# Patient Record
Sex: FEMALE | Race: BLACK OR AFRICAN AMERICAN | URBAN - METROPOLITAN AREA
[De-identification: names, ages, dates, MRNs, and addresses within clinical notes are randomized per-mention and may not be internally consistent; named-entity substitution may affect disease eponyms.]

---

## 2017-05-16 ENCOUNTER — APPOINTMENT (OUTPATIENT)
Dept: URBAN - METROPOLITAN AREA CLINIC 292 | Age: 72
Setting detail: DERMATOLOGY
End: 2017-05-16

## 2017-05-16 DIAGNOSIS — L40.0 PSORIASIS VULGARIS: ICD-10-CM

## 2017-05-16 DIAGNOSIS — L66.8 OTHER CICATRICIAL ALOPECIA: ICD-10-CM

## 2017-05-16 DIAGNOSIS — L70.0 ACNE VULGARIS: ICD-10-CM

## 2017-05-16 DIAGNOSIS — L21.8 OTHER SEBORRHEIC DERMATITIS: ICD-10-CM

## 2017-05-16 PROCEDURE — OTHER INTRALESIONAL KENALOG: OTHER

## 2017-05-16 PROCEDURE — 11901 INJECT SKIN LESIONS >7: CPT

## 2017-05-16 PROCEDURE — OTHER COUNSELING: OTHER

## 2017-05-16 PROCEDURE — OTHER CHEMICAL PEEL GLYCOLIC ACID: OTHER

## 2017-05-16 PROCEDURE — OTHER PRESCRIPTION: OTHER

## 2017-05-16 PROCEDURE — 99214 OFFICE O/P EST MOD 30 MIN: CPT | Mod: 25

## 2017-05-16 PROCEDURE — 10040 ACNE SURGERY: CPT

## 2017-05-16 PROCEDURE — OTHER ACNE SURGERY: OTHER

## 2017-05-16 RX ORDER — SECUKINUMAB 150 MG/ML
INJECTION SUBCUTANEOUS
Qty: 1 | Refills: 5 | Status: ERX

## 2017-05-16 RX ORDER — BETAMETHASONE VALERATE 1 MG/G
OINTMENT TOPICAL
Qty: 1 | Refills: 2 | Status: ERX

## 2017-05-16 RX ORDER — KETOCONAZOLE 20.5 MG/ML
SHAMPOO, SUSPENSION TOPICAL
Qty: 1 | Refills: 5 | Status: ERX

## 2017-05-16 ASSESSMENT — LOCATION DETAILED DESCRIPTION DERM
LOCATION DETAILED: RIGHT PROXIMAL PRETIBIAL REGION
LOCATION DETAILED: RIGHT MEDIAL SUPERIOR EYELID
LOCATION DETAILED: RIGHT DISTAL PRETIBIAL REGION
LOCATION DETAILED: LEFT PROXIMAL PRETIBIAL REGION
LOCATION DETAILED: LEFT CENTRAL PARIETAL SCALP
LOCATION DETAILED: LEFT CENTRAL FRONTAL SCALP
LOCATION DETAILED: HAIR
LOCATION DETAILED: LEFT DISTAL PRETIBIAL REGION
LOCATION DETAILED: RIGHT SUPERIOR FOREHEAD
LOCATION DETAILED: RIGHT SUPERIOR MEDIAL FOREHEAD

## 2017-05-16 ASSESSMENT — LOCATION SIMPLE DESCRIPTION DERM
LOCATION SIMPLE: RIGHT SUPERIOR EYELID
LOCATION SIMPLE: RIGHT PRETIBIAL REGION
LOCATION SIMPLE: LEFT SCALP
LOCATION SIMPLE: RIGHT FOREHEAD
LOCATION SIMPLE: SCALP
LOCATION SIMPLE: HAIR
LOCATION SIMPLE: LEFT PRETIBIAL REGION

## 2017-05-16 ASSESSMENT — LOCATION ZONE DERM
LOCATION ZONE: FACE
LOCATION ZONE: EYELID
LOCATION ZONE: LEG
LOCATION ZONE: SCALP

## 2017-05-16 NOTE — HPI: MEDICATION (COSENTYX)
How Severe Is It?: severe
Is This A New Presentation, Or A Follow-Up?: Follow Up Cosunax
Additional History: worse have not had cosentex

## 2017-05-16 NOTE — PROCEDURE: CHEMICAL PEEL GLYCOLIC ACID
Glycolic Acid %: 30%
Prep: The treated area was degreased with acetone and vaseline was applied for protection of mucous membranes.
Detail Level: Zone
Price (Use Numbers Only, No Special Characters Or $): 00
Time (Mins): 3
Treatment Number: 0
Post Peel Care: The peel was neutralized with sodium bicarbonate.  After the procedure, the treatment area was washed with soap and water, and a post-peel cream was applied. Sun protection and post-care instructions were reviewed with the patient.

## 2017-05-16 NOTE — PROCEDURE: INTRALESIONAL KENALOG
Kenalog Preparation: Kenalog
Concentration Of Solution Injected (Mg/Ml): 10.0
Size Of Lesion (Optional): 0
Detail Level: Detailed
Concentration Of Solution Injected (Mg/Ml): 2.5
Total Volume Injected (Ccs- Only Use Numbers And Decimals): 3
Total Volume Injected (Ccs- Only Use Numbers And Decimals): 2

## 2017-05-16 NOTE — PROCEDURE: ACNE SURGERY
Consent was obtained and risks were reviewed including but not limited to scarring, infection, bleeding, scabbing, incomplete removal, and allergy to anesthesia.
Render Number Of Lesions Treated: no
Detail Level: Detailed
Post-Care Instructions: I reviewed with the patient in detail post-care instructions. Patient is to keep the treatment areaas dry overnight, and then apply bacitracin twice daily until healed. Patient may apply hydrogen peroxide soaks to remove any crusting.
Acne Type: Comedonal Lesions
Prep Text (Optional): Prior to removal the treatment areas were prepped in the usual fashion.

## 2017-08-03 ENCOUNTER — APPOINTMENT (OUTPATIENT)
Dept: URBAN - METROPOLITAN AREA CLINIC 292 | Age: 72
Setting detail: DERMATOLOGY
End: 2017-08-03

## 2017-08-03 DIAGNOSIS — L66.8 OTHER CICATRICIAL ALOPECIA: ICD-10-CM

## 2017-08-03 DIAGNOSIS — L21.8 OTHER SEBORRHEIC DERMATITIS: ICD-10-CM

## 2017-08-03 DIAGNOSIS — L40.0 PSORIASIS VULGARIS: ICD-10-CM

## 2017-08-03 DIAGNOSIS — L70.0 ACNE VULGARIS: ICD-10-CM

## 2017-08-03 PROCEDURE — OTHER ACNE SURGERY: OTHER

## 2017-08-03 PROCEDURE — OTHER TREATMENT REGIMEN: OTHER

## 2017-08-03 PROCEDURE — OTHER PRESCRIPTION: OTHER

## 2017-08-03 PROCEDURE — 11901 INJECT SKIN LESIONS >7: CPT

## 2017-08-03 PROCEDURE — OTHER CHEMICAL PEEL GLYCOLIC ACID: OTHER

## 2017-08-03 PROCEDURE — 10040 ACNE SURGERY: CPT

## 2017-08-03 PROCEDURE — 99214 OFFICE O/P EST MOD 30 MIN: CPT | Mod: 25

## 2017-08-03 PROCEDURE — OTHER INTRALESIONAL KENALOG: OTHER

## 2017-08-03 PROCEDURE — OTHER COUNSELING: OTHER

## 2017-08-03 RX ORDER — BETAMETHASONE VALERATE 1 MG/G
OINTMENT TOPICAL
Qty: 1 | Refills: 2 | Status: ERX

## 2017-08-03 ASSESSMENT — LOCATION DETAILED DESCRIPTION DERM
LOCATION DETAILED: RIGHT MEDIAL SUPERIOR EYELID
LOCATION DETAILED: LEFT PROXIMAL PRETIBIAL REGION
LOCATION DETAILED: RIGHT SUPERIOR MEDIAL FOREHEAD
LOCATION DETAILED: RIGHT PROXIMAL PRETIBIAL REGION
LOCATION DETAILED: RIGHT SUPERIOR FOREHEAD
LOCATION DETAILED: LEFT CENTRAL FRONTAL SCALP
LOCATION DETAILED: LEFT DISTAL PRETIBIAL REGION
LOCATION DETAILED: LEFT CENTRAL PARIETAL SCALP
LOCATION DETAILED: RIGHT DISTAL PRETIBIAL REGION

## 2017-08-03 ASSESSMENT — LOCATION SIMPLE DESCRIPTION DERM
LOCATION SIMPLE: LEFT SCALP
LOCATION SIMPLE: RIGHT SUPERIOR EYELID
LOCATION SIMPLE: RIGHT FOREHEAD
LOCATION SIMPLE: RIGHT PRETIBIAL REGION
LOCATION SIMPLE: LEFT PRETIBIAL REGION
LOCATION SIMPLE: SCALP

## 2017-08-03 ASSESSMENT — LOCATION ZONE DERM
LOCATION ZONE: FACE
LOCATION ZONE: SCALP
LOCATION ZONE: EYELID
LOCATION ZONE: LEG

## 2017-08-03 NOTE — PROCEDURE: CHEMICAL PEEL GLYCOLIC ACID
Treatment Number: 0
Time (Mins): 3
Prep: The treated area was degreased with acetone and vaseline was applied for protection of mucous membranes.
Post Peel Care: The peel was neutralized with sodium bicarbonate.  After the procedure, the treatment area was washed with soap and water, and a post-peel cream was applied. Sun protection and post-care instructions were reviewed with the patient.
Price (Use Numbers Only, No Special Characters Or $): 00
Glycolic Acid %: 30%
Detail Level: Zone

## 2017-08-03 NOTE — HPI: MEDICATION (COSENTYX)
How Severe Is It?: moderate
Is This A New Presentation, Or A Follow-Up?: Follow Up Cosunax
What Dose Of Cosentyx Are You Taking?: 300mg SC every 4 weeks
How Many Months Of Cosentyx Have You Completed?: 12

## 2017-08-03 NOTE — PROCEDURE: ACNE SURGERY
Prep Text (Optional): Prior to removal the treatment areas were prepped in the usual fashion.
Post-Care Instructions: I reviewed with the patient in detail post-care instructions. Patient is to keep the treatment areaas dry overnight, and then apply bacitracin twice daily until healed. Patient may apply hydrogen peroxide soaks to remove any crusting.
Render Number Of Lesions Treated: no
Consent was obtained and risks were reviewed including but not limited to scarring, infection, bleeding, scabbing, incomplete removal, and allergy to anesthesia.
Acne Type: Comedonal Lesions
Detail Level: Detailed

## 2017-08-03 NOTE — PROCEDURE: INTRALESIONAL KENALOG
X Size Of Lesion In Cm (Optional): 0
Total Volume Injected (Ccs- Only Use Numbers And Decimals): 2
Concentration Of Solution Injected (Mg/Ml): 10.0
Detail Level: Detailed
Concentration Of Solution Injected (Mg/Ml): 2.5
Kenalog Preparation: Kenalog
Total Volume Injected (Ccs- Only Use Numbers And Decimals): 3

## 2017-08-24 ENCOUNTER — RX ONLY (RX ONLY)
Age: 72
End: 2017-08-24

## 2017-08-24 RX ORDER — DOXEPIN HYDROCHLORIDE 25 MG/1
CAPSULE ORAL
Qty: 30 | Refills: 3 | Status: ERX | COMMUNITY
Start: 2017-08-24

## 2017-08-31 ENCOUNTER — APPOINTMENT (OUTPATIENT)
Dept: URBAN - METROPOLITAN AREA CLINIC 292 | Age: 72
Setting detail: DERMATOLOGY
End: 2017-08-31

## 2017-08-31 DIAGNOSIS — L66.8 OTHER CICATRICIAL ALOPECIA: ICD-10-CM

## 2017-08-31 DIAGNOSIS — L40.0 PSORIASIS VULGARIS: ICD-10-CM

## 2017-08-31 DIAGNOSIS — L21.8 OTHER SEBORRHEIC DERMATITIS: ICD-10-CM

## 2017-08-31 DIAGNOSIS — L70.0 ACNE VULGARIS: ICD-10-CM

## 2017-08-31 PROCEDURE — OTHER ACNE SURGERY: OTHER

## 2017-08-31 PROCEDURE — 99213 OFFICE O/P EST LOW 20 MIN: CPT | Mod: 25

## 2017-08-31 PROCEDURE — 10040 ACNE SURGERY: CPT

## 2017-08-31 PROCEDURE — OTHER PRESCRIPTION: OTHER

## 2017-08-31 PROCEDURE — OTHER TREATMENT REGIMEN: OTHER

## 2017-08-31 PROCEDURE — OTHER COUNSELING: OTHER

## 2017-08-31 PROCEDURE — 11901 INJECT SKIN LESIONS >7: CPT

## 2017-08-31 PROCEDURE — OTHER CHEMICAL PEEL GLYCOLIC ACID: OTHER

## 2017-08-31 PROCEDURE — OTHER INTRALESIONAL KENALOG: OTHER

## 2017-08-31 RX ORDER — BETAMETHASONE VALERATE 1 MG/G
OINTMENT TOPICAL
Qty: 1 | Refills: 2 | Status: ERX

## 2017-08-31 ASSESSMENT — LOCATION SIMPLE DESCRIPTION DERM
LOCATION SIMPLE: LEFT PRETIBIAL REGION
LOCATION SIMPLE: RIGHT FOREHEAD
LOCATION SIMPLE: SCALP
LOCATION SIMPLE: RIGHT SUPERIOR EYELID
LOCATION SIMPLE: LEFT SCALP
LOCATION SIMPLE: RIGHT PRETIBIAL REGION

## 2017-08-31 ASSESSMENT — LOCATION DETAILED DESCRIPTION DERM
LOCATION DETAILED: RIGHT MEDIAL SUPERIOR EYELID
LOCATION DETAILED: RIGHT SUPERIOR FOREHEAD
LOCATION DETAILED: LEFT CENTRAL FRONTAL SCALP
LOCATION DETAILED: LEFT DISTAL PRETIBIAL REGION
LOCATION DETAILED: LEFT CENTRAL PARIETAL SCALP
LOCATION DETAILED: RIGHT PROXIMAL PRETIBIAL REGION
LOCATION DETAILED: LEFT PROXIMAL PRETIBIAL REGION
LOCATION DETAILED: RIGHT SUPERIOR MEDIAL FOREHEAD
LOCATION DETAILED: RIGHT DISTAL PRETIBIAL REGION

## 2017-08-31 ASSESSMENT — LOCATION ZONE DERM
LOCATION ZONE: SCALP
LOCATION ZONE: LEG
LOCATION ZONE: EYELID
LOCATION ZONE: FACE

## 2017-08-31 NOTE — PROCEDURE: CHEMICAL PEEL GLYCOLIC ACID
Prep: The treated area was degreased with acetone and vaseline was applied for protection of mucous membranes.
Detail Level: Zone
Post Peel Care: The peel was neutralized with sodium bicarbonate.  After the procedure, the treatment area was washed with soap and water, and a post-peel cream was applied. Sun protection and post-care instructions were reviewed with the patient.
Time (Mins): 3
Treatment Number: 0
Price (Use Numbers Only, No Special Characters Or $): 00
Glycolic Acid %: 30%

## 2017-08-31 NOTE — PROCEDURE: ACNE SURGERY
Post-Care Instructions: I reviewed with the patient in detail post-care instructions. Patient is to keep the treatment areaas dry overnight, and then apply bacitracin twice daily until healed. Patient may apply hydrogen peroxide soaks to remove any crusting.
Prep Text (Optional): Prior to removal the treatment areas were prepped in the usual fashion.
Consent was obtained and risks were reviewed including but not limited to scarring, infection, bleeding, scabbing, incomplete removal, and allergy to anesthesia.
Acne Type: Comedonal Lesions
Render Number Of Lesions Treated: no
Detail Level: Detailed

## 2017-08-31 NOTE — PROCEDURE: INTRALESIONAL KENALOG
Kenalog Preparation: Kenalog
Total Volume Injected (Ccs- Only Use Numbers And Decimals): 2
X Size Of Lesion In Cm (Optional): 0
Total Volume Injected (Ccs- Only Use Numbers And Decimals): 3
Concentration Of Solution Injected (Mg/Ml): 10.0
Concentration Of Solution Injected (Mg/Ml): 2.5
Detail Level: Detailed

## 2017-09-28 ENCOUNTER — APPOINTMENT (OUTPATIENT)
Dept: URBAN - METROPOLITAN AREA CLINIC 292 | Age: 72
Setting detail: DERMATOLOGY
End: 2017-09-28

## 2017-09-28 DIAGNOSIS — L66.8 OTHER CICATRICIAL ALOPECIA: ICD-10-CM

## 2017-09-28 DIAGNOSIS — L40.0 PSORIASIS VULGARIS: ICD-10-CM

## 2017-09-28 DIAGNOSIS — L21.8 OTHER SEBORRHEIC DERMATITIS: ICD-10-CM

## 2017-09-28 DIAGNOSIS — L70.0 ACNE VULGARIS: ICD-10-CM

## 2017-09-28 PROCEDURE — OTHER PRESCRIPTION: OTHER

## 2017-09-28 PROCEDURE — OTHER TREATMENT REGIMEN: OTHER

## 2017-09-28 PROCEDURE — OTHER COUNSELING: OTHER

## 2017-09-28 PROCEDURE — 10040 ACNE SURGERY: CPT

## 2017-09-28 PROCEDURE — 99214 OFFICE O/P EST MOD 30 MIN: CPT | Mod: 25

## 2017-09-28 PROCEDURE — OTHER INTRALESIONAL KENALOG: OTHER

## 2017-09-28 PROCEDURE — 11900 INJECT SKIN LESIONS </W 7: CPT

## 2017-09-28 PROCEDURE — OTHER ACNE SURGERY: OTHER

## 2017-09-28 PROCEDURE — OTHER CHEMICAL PEEL GLYCOLIC ACID: OTHER

## 2017-09-28 RX ORDER — BETAMETHASONE VALERATE 1 MG/G
OINTMENT TOPICAL
Qty: 1 | Refills: 2 | Status: ERX

## 2017-09-28 ASSESSMENT — LOCATION ZONE DERM
LOCATION ZONE: EYELID
LOCATION ZONE: FACE
LOCATION ZONE: SCALP

## 2017-09-28 ASSESSMENT — LOCATION DETAILED DESCRIPTION DERM
LOCATION DETAILED: LEFT CENTRAL PARIETAL SCALP
LOCATION DETAILED: RIGHT SUPERIOR MEDIAL FOREHEAD
LOCATION DETAILED: RIGHT SUPERIOR FOREHEAD
LOCATION DETAILED: RIGHT MEDIAL SUPERIOR EYELID
LOCATION DETAILED: LEFT CENTRAL FRONTAL SCALP

## 2017-09-28 ASSESSMENT — LOCATION SIMPLE DESCRIPTION DERM
LOCATION SIMPLE: SCALP
LOCATION SIMPLE: RIGHT SUPERIOR EYELID
LOCATION SIMPLE: LEFT SCALP
LOCATION SIMPLE: RIGHT FOREHEAD

## 2017-09-28 NOTE — PROCEDURE: ACNE SURGERY
Detail Level: Detailed
Consent was obtained and risks were reviewed including but not limited to scarring, infection, bleeding, scabbing, incomplete removal, and allergy to anesthesia.
Post-Care Instructions: I reviewed with the patient in detail post-care instructions. Patient is to keep the treatment areaas dry overnight, and then apply bacitracin twice daily until healed. Patient may apply hydrogen peroxide soaks to remove any crusting.
Prep Text (Optional): Prior to removal the treatment areas were prepped in the usual fashion.
Acne Type: Comedonal Lesions
Render Number Of Lesions Treated: no

## 2017-09-28 NOTE — PROCEDURE: CHEMICAL PEEL GLYCOLIC ACID
Post Peel Care: The peel was neutralized with sodium bicarbonate.  After the procedure, the treatment area was washed with soap and water, and a post-peel cream was applied. Sun protection and post-care instructions were reviewed with the patient.
Time (Mins): 3
Price (Use Numbers Only, No Special Characters Or $): 00
Prep: The treated area was degreased with acetone and vaseline was applied for protection of mucous membranes.
Treatment Number: 0
Glycolic Acid %: 30%
Detail Level: Zone

## 2017-09-28 NOTE — PROCEDURE: INTRALESIONAL KENALOG
X Size Of Lesion In Cm (Optional): 0
Total Volume Injected (Ccs- Only Use Numbers And Decimals): 2
Concentration Of Solution Injected (Mg/Ml): 2.5
Kenalog Preparation: Kenalog
Detail Level: Detailed

## 2018-05-29 ENCOUNTER — APPOINTMENT (OUTPATIENT)
Dept: URBAN - METROPOLITAN AREA CLINIC 292 | Age: 73
Setting detail: DERMATOLOGY
End: 2018-05-29

## 2018-05-29 DIAGNOSIS — L66.8 OTHER CICATRICIAL ALOPECIA: ICD-10-CM

## 2018-05-29 DIAGNOSIS — L40.0 PSORIASIS VULGARIS: ICD-10-CM

## 2018-05-29 DIAGNOSIS — L21.8 OTHER SEBORRHEIC DERMATITIS: ICD-10-CM

## 2018-05-29 DIAGNOSIS — L70.0 ACNE VULGARIS: ICD-10-CM

## 2018-05-29 PROCEDURE — OTHER CHEMICAL PEEL GLYCOLIC ACID: OTHER

## 2018-05-29 PROCEDURE — OTHER PRESCRIPTION: OTHER

## 2018-05-29 PROCEDURE — OTHER COUNSELING: OTHER

## 2018-05-29 PROCEDURE — 10040 ACNE SURGERY: CPT

## 2018-05-29 PROCEDURE — OTHER TREATMENT REGIMEN: OTHER

## 2018-05-29 PROCEDURE — OTHER ACNE SURGERY: OTHER

## 2018-05-29 PROCEDURE — OTHER INTRALESIONAL KENALOG: OTHER

## 2018-05-29 PROCEDURE — 11900 INJECT SKIN LESIONS </W 7: CPT

## 2018-05-29 PROCEDURE — 99213 OFFICE O/P EST LOW 20 MIN: CPT | Mod: 25

## 2018-05-29 RX ORDER — AMMONIUM LACTATE 12 %
LOTION (GRAM) TOPICAL
Qty: 1 | Refills: 3 | Status: ERX | COMMUNITY
Start: 2018-05-29

## 2018-05-29 RX ORDER — TRIAMCINOLONE ACETONIDE 1 MG/G
OINTMENT TOPICAL
Qty: 1 | Refills: 2 | Status: ERX

## 2018-05-29 RX ORDER — BETAMETHASONE VALERATE 1 MG/G
OINTMENT TOPICAL
Qty: 1 | Refills: 2 | Status: ERX

## 2018-05-29 ASSESSMENT — LOCATION DETAILED DESCRIPTION DERM
LOCATION DETAILED: LEFT CENTRAL PARIETAL SCALP
LOCATION DETAILED: LEFT CENTRAL FRONTAL SCALP
LOCATION DETAILED: RIGHT SUPERIOR FOREHEAD
LOCATION DETAILED: RIGHT SUPERIOR MEDIAL FOREHEAD
LOCATION DETAILED: LEFT INFERIOR CENTRAL MALAR CHEEK
LOCATION DETAILED: RIGHT MEDIAL SUPERIOR EYELID
LOCATION DETAILED: LEFT FOREHEAD

## 2018-05-29 ASSESSMENT — LOCATION SIMPLE DESCRIPTION DERM
LOCATION SIMPLE: SCALP
LOCATION SIMPLE: LEFT SCALP
LOCATION SIMPLE: RIGHT SUPERIOR EYELID
LOCATION SIMPLE: LEFT FOREHEAD
LOCATION SIMPLE: RIGHT FOREHEAD
LOCATION SIMPLE: LEFT CHEEK

## 2018-05-29 ASSESSMENT — LOCATION ZONE DERM
LOCATION ZONE: FACE
LOCATION ZONE: SCALP
LOCATION ZONE: EYELID

## 2018-05-29 NOTE — PROCEDURE: INTRALESIONAL KENALOG
Include Z78.9 (Other Specified Conditions Influencing Health Status) As An Associated Diagnosis?: No
Size Of Lesion (Optional): 0
Total Volume Injected (Ccs- Only Use Numbers And Decimals): 1
Kenalog Preparation: Kenalog
Detail Level: Detailed
Medical Necessity Clause: This procedure was medically necessary because the lesions that were treated were:
Concentration Of Solution Injected (Mg/Ml): 2.5
Total Volume Injected (Ccs- Only Use Numbers And Decimals): 2

## 2018-05-29 NOTE — PROCEDURE: ACNE SURGERY
Acne Type: Comedonal Lesions
Consent was obtained and risks were reviewed including but not limited to scarring, infection, bleeding, scabbing, incomplete removal, and allergy to anesthesia.
Render Post-Care Instructions In Note?: no
Post-Care Instructions: I reviewed with the patient in detail post-care instructions. Patient is to keep the treatment areaas dry overnight, and then apply bacitracin twice daily until healed. Patient may apply hydrogen peroxide soaks to remove any crusting.
Prep Text (Optional): Prior to removal the treatment areas were prepped in the usual fashion.
Detail Level: Detailed

## 2018-06-26 ENCOUNTER — APPOINTMENT (OUTPATIENT)
Dept: URBAN - METROPOLITAN AREA CLINIC 292 | Age: 73
Setting detail: DERMATOLOGY
End: 2018-06-26

## 2018-06-26 DIAGNOSIS — L40.0 PSORIASIS VULGARIS: ICD-10-CM

## 2018-06-26 DIAGNOSIS — L66.8 OTHER CICATRICIAL ALOPECIA: ICD-10-CM

## 2018-06-26 DIAGNOSIS — L21.8 OTHER SEBORRHEIC DERMATITIS: ICD-10-CM

## 2018-06-26 DIAGNOSIS — L70.0 ACNE VULGARIS: ICD-10-CM

## 2018-06-26 PROCEDURE — OTHER COUNSELING: OTHER

## 2018-06-26 PROCEDURE — OTHER TREATMENT REGIMEN: OTHER

## 2018-06-26 PROCEDURE — OTHER ACNE SURGERY: OTHER

## 2018-06-26 PROCEDURE — 11901 INJECT SKIN LESIONS >7: CPT

## 2018-06-26 PROCEDURE — 99213 OFFICE O/P EST LOW 20 MIN: CPT | Mod: 25

## 2018-06-26 PROCEDURE — OTHER INTRALESIONAL KENALOG: OTHER

## 2018-06-26 PROCEDURE — OTHER CHEMICAL PEEL GLYCOLIC ACID: OTHER

## 2018-06-26 PROCEDURE — 10040 ACNE SURGERY: CPT

## 2018-06-26 ASSESSMENT — LOCATION ZONE DERM
LOCATION ZONE: FACE
LOCATION ZONE: SCALP
LOCATION ZONE: LEG
LOCATION ZONE: EYELID

## 2018-06-26 ASSESSMENT — LOCATION DETAILED DESCRIPTION DERM
LOCATION DETAILED: RIGHT MEDIAL SUPERIOR EYELID
LOCATION DETAILED: LEFT CENTRAL FRONTAL SCALP
LOCATION DETAILED: LEFT DISTAL PRETIBIAL REGION
LOCATION DETAILED: LEFT PROXIMAL PRETIBIAL REGION
LOCATION DETAILED: LEFT INFERIOR CENTRAL MALAR CHEEK
LOCATION DETAILED: RIGHT SUPERIOR MEDIAL FOREHEAD
LOCATION DETAILED: RIGHT SUPERIOR FOREHEAD
LOCATION DETAILED: LEFT CENTRAL PARIETAL SCALP

## 2018-06-26 ASSESSMENT — LOCATION SIMPLE DESCRIPTION DERM
LOCATION SIMPLE: RIGHT SUPERIOR EYELID
LOCATION SIMPLE: LEFT SCALP
LOCATION SIMPLE: LEFT CHEEK
LOCATION SIMPLE: RIGHT FOREHEAD
LOCATION SIMPLE: SCALP
LOCATION SIMPLE: LEFT PRETIBIAL REGION

## 2018-06-26 NOTE — PROCEDURE: ACNE SURGERY
Render Post-Care Instructions In Note?: no
Consent was obtained and risks were reviewed including but not limited to scarring, infection, bleeding, scabbing, incomplete removal, and allergy to anesthesia.
Detail Level: Detailed
Prep Text (Optional): Prior to removal the treatment areas were prepped in the usual fashion.
Acne Type: Comedonal Lesions
Post-Care Instructions: I reviewed with the patient in detail post-care instructions. Patient is to keep the treatment areaas dry overnight, and then apply bacitracin twice daily until healed. Patient may apply hydrogen peroxide soaks to remove any crusting.

## 2018-06-26 NOTE — PROCEDURE: CHEMICAL PEEL GLYCOLIC ACID
Detail Level: Zone
Glycolic Acid %: 30%
Post Peel Care: The peel was neutralized with sodium bicarbonate.  After the procedure, the treatment area was washed with soap and water, and a post-peel cream was applied. Sun protection and post-care instructions were reviewed with the patient.
Prep: The treated area was degreased with acetone and vaseline was applied for protection of mucous membranes.
Price (Use Numbers Only, No Special Characters Or $): 00
Time (Mins): 3
Treatment Number: 0

## 2018-06-26 NOTE — PROCEDURE: INTRALESIONAL KENALOG
Include Z78.9 (Other Specified Conditions Influencing Health Status) As An Associated Diagnosis?: No
X Size Of Lesion In Cm (Optional): 0
Concentration Of Solution Injected (Mg/Ml): 2.5
Medical Necessity Clause: This procedure was medically necessary because the lesions that were treated were:
Detail Level: Detailed
Kenalog Preparation: Kenalog
Total Volume Injected (Ccs- Only Use Numbers And Decimals): 2
Total Volume Injected (Ccs- Only Use Numbers And Decimals): 1
Concentration Of Solution Injected (Mg/Ml): 20.0

## 2018-06-28 ENCOUNTER — APPOINTMENT (OUTPATIENT)
Dept: URBAN - METROPOLITAN AREA CLINIC 292 | Age: 73
Setting detail: DERMATOLOGY
End: 2018-06-28

## 2018-06-28 DIAGNOSIS — L40.0 PSORIASIS VULGARIS: ICD-10-CM

## 2018-06-28 PROCEDURE — OTHER TREATMENT REGIMEN: OTHER

## 2018-06-28 PROCEDURE — OTHER COUNSELING: OTHER

## 2018-06-28 PROCEDURE — OTHER ORDER TESTS: OTHER

## 2018-06-28 ASSESSMENT — LOCATION DETAILED DESCRIPTION DERM
LOCATION DETAILED: LEFT ANTERIOR DISTAL THIGH
LOCATION DETAILED: LEFT LATERAL SUPERIOR CHEST

## 2018-06-28 ASSESSMENT — LOCATION SIMPLE DESCRIPTION DERM
LOCATION SIMPLE: LEFT THIGH
LOCATION SIMPLE: CHEST

## 2018-06-28 ASSESSMENT — LOCATION ZONE DERM
LOCATION ZONE: TRUNK
LOCATION ZONE: LEG

## 2018-08-06 ENCOUNTER — APPOINTMENT (OUTPATIENT)
Dept: URBAN - METROPOLITAN AREA CLINIC 292 | Age: 73
Setting detail: DERMATOLOGY
End: 2018-08-06

## 2018-08-06 DIAGNOSIS — L70.0 ACNE VULGARIS: ICD-10-CM

## 2018-08-06 DIAGNOSIS — L40.0 PSORIASIS VULGARIS: ICD-10-CM

## 2018-08-06 DIAGNOSIS — L21.8 OTHER SEBORRHEIC DERMATITIS: ICD-10-CM

## 2018-08-06 DIAGNOSIS — L66.8 OTHER CICATRICIAL ALOPECIA: ICD-10-CM

## 2018-08-06 PROCEDURE — 11900 INJECT SKIN LESIONS </W 7: CPT

## 2018-08-06 PROCEDURE — OTHER TREATMENT REGIMEN: OTHER

## 2018-08-06 PROCEDURE — 99213 OFFICE O/P EST LOW 20 MIN: CPT | Mod: 25

## 2018-08-06 PROCEDURE — OTHER PPD: OTHER

## 2018-08-06 PROCEDURE — OTHER INTRALESIONAL KENALOG: OTHER

## 2018-08-06 PROCEDURE — 10040 ACNE SURGERY: CPT

## 2018-08-06 PROCEDURE — OTHER PRESCRIPTION: OTHER

## 2018-08-06 PROCEDURE — OTHER CHEMICAL PEEL GLYCOLIC ACID: OTHER

## 2018-08-06 PROCEDURE — 86580 TB INTRADERMAL TEST: CPT

## 2018-08-06 PROCEDURE — OTHER COUNSELING: OTHER

## 2018-08-06 PROCEDURE — OTHER ACNE SURGERY: OTHER

## 2018-08-06 RX ORDER — BETAMETHASONE DIPROPIONATE 0.5 MG/G
OINTMENT TOPICAL
Qty: 1 | Refills: 4 | Status: ERX

## 2018-08-06 ASSESSMENT — LOCATION SIMPLE DESCRIPTION DERM
LOCATION SIMPLE: LEFT CHEEK
LOCATION SIMPLE: LEFT FOREARM
LOCATION SIMPLE: LEFT SCALP
LOCATION SIMPLE: RIGHT SUPERIOR EYELID
LOCATION SIMPLE: SCALP
LOCATION SIMPLE: RIGHT FOREHEAD

## 2018-08-06 ASSESSMENT — LOCATION DETAILED DESCRIPTION DERM
LOCATION DETAILED: RIGHT SUPERIOR FOREHEAD
LOCATION DETAILED: LEFT INFERIOR CENTRAL MALAR CHEEK
LOCATION DETAILED: LEFT CENTRAL FRONTAL SCALP
LOCATION DETAILED: RIGHT MEDIAL SUPERIOR EYELID
LOCATION DETAILED: LEFT CENTRAL PARIETAL SCALP
LOCATION DETAILED: LEFT VENTRAL PROXIMAL FOREARM
LOCATION DETAILED: RIGHT SUPERIOR MEDIAL FOREHEAD

## 2018-08-06 ASSESSMENT — LOCATION ZONE DERM
LOCATION ZONE: ARM
LOCATION ZONE: FACE
LOCATION ZONE: SCALP
LOCATION ZONE: EYELID

## 2018-08-06 NOTE — PROCEDURE: ACNE SURGERY
Detail Level: Detailed
Acne Type: Comedonal Lesions
Post-Care Instructions: I reviewed with the patient in detail post-care instructions. Patient is to keep the treatment areaas dry overnight, and then apply bacitracin twice daily until healed. Patient may apply hydrogen peroxide soaks to remove any crusting.
Render Number Of Lesions Treated: no
Prep Text (Optional): Prior to removal the treatment areas were prepped in the usual fashion.
Consent was obtained and risks were reviewed including but not limited to scarring, infection, bleeding, scabbing, incomplete removal, and allergy to anesthesia.

## 2018-08-06 NOTE — PROCEDURE: INTRALESIONAL KENALOG
Total Volume Injected (Ccs- Only Use Numbers And Decimals): 2
Medical Necessity Clause: This procedure was medically necessary because the lesions that were treated were:
Include Z78.9 (Other Specified Conditions Influencing Health Status) As An Associated Diagnosis?: No
Kenalog Preparation: Kenalog
Total Volume Injected (Ccs- Only Use Numbers And Decimals): 1
Detail Level: Detailed
X Size Of Lesion In Cm (Optional): 0
Concentration Of Solution Injected (Mg/Ml): 2.5

## 2018-08-06 NOTE — PROCEDURE: CHEMICAL PEEL GLYCOLIC ACID
Glycolic Acid %: 30%
Treatment Number: 0
Price (Use Numbers Only, No Special Characters Or $): 00
Detail Level: Zone
Post Peel Care: The peel was neutralized with sodium bicarbonate.  After the procedure, the treatment area was washed with soap and water, and a post-peel cream was applied. Sun protection and post-care instructions were reviewed with the patient.
Prep: The treated area was degreased with acetone and vaseline was applied for protection of mucous membranes.
Time (Mins): 3

## 2018-08-08 ENCOUNTER — APPOINTMENT (OUTPATIENT)
Dept: URBAN - METROPOLITAN AREA CLINIC 292 | Age: 73
Setting detail: DERMATOLOGY
End: 2018-08-08

## 2018-08-08 DIAGNOSIS — L40.0 PSORIASIS VULGARIS: ICD-10-CM

## 2018-08-08 PROCEDURE — OTHER PPD RESULT: OTHER

## 2018-08-08 ASSESSMENT — LOCATION SIMPLE DESCRIPTION DERM: LOCATION SIMPLE: LEFT FOREARM

## 2018-08-08 ASSESSMENT — LOCATION DETAILED DESCRIPTION DERM: LOCATION DETAILED: LEFT VENTRAL PROXIMAL FOREARM

## 2018-08-08 ASSESSMENT — LOCATION ZONE DERM: LOCATION ZONE: ARM

## 2018-09-27 ENCOUNTER — APPOINTMENT (OUTPATIENT)
Dept: URBAN - METROPOLITAN AREA CLINIC 292 | Age: 73
Setting detail: DERMATOLOGY
End: 2018-09-27

## 2018-09-27 DIAGNOSIS — L66.8 OTHER CICATRICIAL ALOPECIA: ICD-10-CM

## 2018-09-27 DIAGNOSIS — L70.0 ACNE VULGARIS: ICD-10-CM

## 2018-09-27 DIAGNOSIS — L21.8 OTHER SEBORRHEIC DERMATITIS: ICD-10-CM

## 2018-09-27 DIAGNOSIS — L40.0 PSORIASIS VULGARIS: ICD-10-CM

## 2018-09-27 PROCEDURE — OTHER ACNE SURGERY: OTHER

## 2018-09-27 PROCEDURE — OTHER COUNSELING: OTHER

## 2018-09-27 PROCEDURE — OTHER INTRALESIONAL KENALOG: OTHER

## 2018-09-27 PROCEDURE — 11900 INJECT SKIN LESIONS </W 7: CPT

## 2018-09-27 PROCEDURE — 10040 ACNE SURGERY: CPT

## 2018-09-27 PROCEDURE — 99213 OFFICE O/P EST LOW 20 MIN: CPT | Mod: 25

## 2018-09-27 PROCEDURE — OTHER CHEMICAL PEEL GLYCOLIC ACID: OTHER

## 2018-09-27 PROCEDURE — OTHER TREATMENT REGIMEN: OTHER

## 2018-09-27 PROCEDURE — OTHER PRESCRIPTION: OTHER

## 2018-09-27 RX ORDER — BETAMETHASONE DIPROPIONATE 0.5 MG/G
OINTMENT TOPICAL
Qty: 1 | Refills: 4 | Status: ERX

## 2018-09-27 ASSESSMENT — LOCATION DETAILED DESCRIPTION DERM
LOCATION DETAILED: RIGHT SUPERIOR MEDIAL FOREHEAD
LOCATION DETAILED: LEFT INFERIOR CENTRAL MALAR CHEEK
LOCATION DETAILED: LEFT CENTRAL FRONTAL SCALP
LOCATION DETAILED: RIGHT SUPERIOR FOREHEAD
LOCATION DETAILED: RIGHT MEDIAL SUPERIOR EYELID
LOCATION DETAILED: LEFT CENTRAL PARIETAL SCALP

## 2018-09-27 ASSESSMENT — LOCATION SIMPLE DESCRIPTION DERM
LOCATION SIMPLE: LEFT SCALP
LOCATION SIMPLE: RIGHT FOREHEAD
LOCATION SIMPLE: RIGHT SUPERIOR EYELID
LOCATION SIMPLE: LEFT CHEEK
LOCATION SIMPLE: SCALP

## 2018-09-27 ASSESSMENT — LOCATION ZONE DERM
LOCATION ZONE: FACE
LOCATION ZONE: SCALP
LOCATION ZONE: EYELID

## 2018-09-27 NOTE — PROCEDURE: ACNE SURGERY
Prep Text (Optional): Prior to removal the treatment areas were prepped in the usual fashion.
Acne Type: Comedonal Lesions
Consent was obtained and risks were reviewed including but not limited to scarring, infection, bleeding, scabbing, incomplete removal, and allergy to anesthesia.
Post-Care Instructions: I reviewed with the patient in detail post-care instructions. Patient is to keep the treatment areaas dry overnight, and then apply bacitracin twice daily until healed. Patient may apply hydrogen peroxide soaks to remove any crusting.
Detail Level: Detailed
Render Post-Care Instructions In Note?: no

## 2018-09-27 NOTE — PROCEDURE: CHEMICAL PEEL GLYCOLIC ACID
Price (Use Numbers Only, No Special Characters Or $): 00
Treatment Number: 0
Glycolic Acid %: 30%
Prep: The treated area was degreased with acetone and vaseline was applied for protection of mucous membranes.
Detail Level: Zone
Post Peel Care: The peel was neutralized with sodium bicarbonate.  After the procedure, the treatment area was washed with soap and water, and a post-peel cream was applied. Sun protection and post-care instructions were reviewed with the patient.
Time (Mins): 3

## 2018-09-27 NOTE — PROCEDURE: INTRALESIONAL KENALOG
Medical Necessity Clause: This procedure was medically necessary because the lesions that were treated were:
Kenalog Preparation: Kenalog
X Size Of Lesion In Cm (Optional): 0
Detail Level: Detailed
Concentration Of Solution Injected (Mg/Ml): 2.5
Total Volume Injected (Ccs- Only Use Numbers And Decimals): 1
Include Z78.9 (Other Specified Conditions Influencing Health Status) As An Associated Diagnosis?: No
Total Volume Injected (Ccs- Only Use Numbers And Decimals): 2

## 2019-05-22 ENCOUNTER — APPOINTMENT (OUTPATIENT)
Dept: URBAN - METROPOLITAN AREA CLINIC 292 | Age: 74
Setting detail: DERMATOLOGY
End: 2019-05-22

## 2019-05-22 DIAGNOSIS — L66.8 OTHER CICATRICIAL ALOPECIA: ICD-10-CM

## 2019-05-22 DIAGNOSIS — L21.8 OTHER SEBORRHEIC DERMATITIS: ICD-10-CM

## 2019-05-22 DIAGNOSIS — L40.0 PSORIASIS VULGARIS: ICD-10-CM

## 2019-05-22 DIAGNOSIS — L70.0 ACNE VULGARIS: ICD-10-CM

## 2019-05-22 PROCEDURE — OTHER COUNSELING: OTHER

## 2019-05-22 PROCEDURE — OTHER ACNE SURGERY: OTHER

## 2019-05-22 PROCEDURE — 10040 ACNE SURGERY: CPT

## 2019-05-22 PROCEDURE — OTHER CHEMICAL PEEL GLYCOLIC ACID: OTHER

## 2019-05-22 PROCEDURE — OTHER PRESCRIPTION: OTHER

## 2019-05-22 PROCEDURE — 99213 OFFICE O/P EST LOW 20 MIN: CPT | Mod: 25

## 2019-05-22 PROCEDURE — OTHER INTRALESIONAL KENALOG: OTHER

## 2019-05-22 PROCEDURE — 11900 INJECT SKIN LESIONS </W 7: CPT

## 2019-05-22 PROCEDURE — OTHER TREATMENT REGIMEN: OTHER

## 2019-05-22 RX ORDER — BENZOYL PEROXIDE 5 G/100G
GEL TOPICAL
Qty: 1 | Refills: 4 | Status: ERX | COMMUNITY
Start: 2019-05-22

## 2019-05-22 RX ORDER — BETAMETHASONE DIPROPIONATE 0.5 MG/G
OINTMENT TOPICAL
Qty: 1 | Refills: 4 | Status: ERX

## 2019-05-22 ASSESSMENT — LOCATION DETAILED DESCRIPTION DERM
LOCATION DETAILED: RIGHT MEDIAL SUPERIOR EYELID
LOCATION DETAILED: LEFT CENTRAL FRONTAL SCALP
LOCATION DETAILED: RIGHT SUPERIOR MEDIAL FOREHEAD
LOCATION DETAILED: LEFT CENTRAL PARIETAL SCALP
LOCATION DETAILED: RIGHT SUPERIOR FOREHEAD
LOCATION DETAILED: LEFT INFERIOR CENTRAL MALAR CHEEK

## 2019-05-22 ASSESSMENT — LOCATION ZONE DERM
LOCATION ZONE: EYELID
LOCATION ZONE: FACE
LOCATION ZONE: SCALP

## 2019-05-22 ASSESSMENT — LOCATION SIMPLE DESCRIPTION DERM
LOCATION SIMPLE: SCALP
LOCATION SIMPLE: LEFT CHEEK
LOCATION SIMPLE: LEFT SCALP
LOCATION SIMPLE: RIGHT SUPERIOR EYELID
LOCATION SIMPLE: RIGHT FOREHEAD

## 2019-05-22 NOTE — PROCEDURE: INTRALESIONAL KENALOG
Total Volume Injected (Ccs- Only Use Numbers And Decimals): 2
Concentration Of Solution Injected (Mg/Ml): 2.5
X Size Of Lesion In Cm (Optional): 0
Medical Necessity Clause: This procedure was medically necessary because the lesions that were treated were:
Total Volume Injected (Ccs- Only Use Numbers And Decimals): 1
Include Z78.9 (Other Specified Conditions Influencing Health Status) As An Associated Diagnosis?: No
Detail Level: Detailed
Kenalog Preparation: Kenalog

## 2019-05-22 NOTE — PROCEDURE: CHEMICAL PEEL GLYCOLIC ACID
Treatment Number: 0
Price (Use Numbers Only, No Special Characters Or $): 00
Post Peel Care: The peel was neutralized with sodium bicarbonate.  After the procedure, the treatment area was washed with soap and water, and a post-peel cream was applied. Sun protection and post-care instructions were reviewed with the patient.
Glycolic Acid %: 30%
Detail Level: Zone
Prep: The treated area was degreased with acetone and vaseline was applied for protection of mucous membranes.
Time (Mins): 3

## 2019-07-25 ENCOUNTER — APPOINTMENT (OUTPATIENT)
Dept: URBAN - METROPOLITAN AREA CLINIC 292 | Age: 74
Setting detail: DERMATOLOGY
End: 2019-07-25

## 2019-07-25 DIAGNOSIS — L50.8 OTHER URTICARIA: ICD-10-CM

## 2019-07-25 DIAGNOSIS — L66.8 OTHER CICATRICIAL ALOPECIA: ICD-10-CM

## 2019-07-25 DIAGNOSIS — L21.8 OTHER SEBORRHEIC DERMATITIS: ICD-10-CM

## 2019-07-25 DIAGNOSIS — L70.0 ACNE VULGARIS: ICD-10-CM

## 2019-07-25 DIAGNOSIS — L40.0 PSORIASIS VULGARIS: ICD-10-CM

## 2019-07-25 PROCEDURE — OTHER CHEMICAL PEEL GLYCOLIC ACID: OTHER

## 2019-07-25 PROCEDURE — OTHER TREATMENT REGIMEN: OTHER

## 2019-07-25 PROCEDURE — 10040 ACNE SURGERY: CPT

## 2019-07-25 PROCEDURE — 11900 INJECT SKIN LESIONS </W 7: CPT

## 2019-07-25 PROCEDURE — OTHER PRESCRIPTION: OTHER

## 2019-07-25 PROCEDURE — OTHER INTRALESIONAL KENALOG: OTHER

## 2019-07-25 PROCEDURE — OTHER ACNE SURGERY: OTHER

## 2019-07-25 PROCEDURE — 99213 OFFICE O/P EST LOW 20 MIN: CPT | Mod: 25

## 2019-07-25 PROCEDURE — OTHER COUNSELING: OTHER

## 2019-07-25 RX ORDER — BETAMETHASONE DIPROPIONATE 0.5 MG/G
OINTMENT TOPICAL
Qty: 1 | Refills: 4 | Status: ERX

## 2019-07-25 RX ORDER — HYDROXYZINE HYDROCHLORIDE 25 MG/1
TABLET, FILM COATED ORAL
Qty: 60 | Refills: 2 | Status: ERX | COMMUNITY
Start: 2019-07-25

## 2019-07-25 RX ORDER — BENZOYL PEROXIDE 5 G/100G
GEL TOPICAL
Qty: 1 | Refills: 4 | Status: ERX

## 2019-07-25 RX ORDER — LORATADINE 10 MG/1
CAPSULE, LIQUID FILLED ORAL
Qty: 60 | Refills: 3 | Status: ERX | COMMUNITY
Start: 2019-07-25

## 2019-07-25 ASSESSMENT — LOCATION SIMPLE DESCRIPTION DERM
LOCATION SIMPLE: SCALP
LOCATION SIMPLE: LEFT SCALP
LOCATION SIMPLE: RIGHT FOREHEAD
LOCATION SIMPLE: CHEST
LOCATION SIMPLE: LEFT CHEEK
LOCATION SIMPLE: RIGHT SUPERIOR EYELID

## 2019-07-25 ASSESSMENT — LOCATION DETAILED DESCRIPTION DERM
LOCATION DETAILED: LEFT CENTRAL FRONTAL SCALP
LOCATION DETAILED: LEFT CENTRAL PARIETAL SCALP
LOCATION DETAILED: RIGHT MEDIAL SUPERIOR EYELID
LOCATION DETAILED: RIGHT SUPERIOR MEDIAL FOREHEAD
LOCATION DETAILED: LEFT LATERAL SUPERIOR CHEST
LOCATION DETAILED: LEFT INFERIOR CENTRAL MALAR CHEEK
LOCATION DETAILED: RIGHT SUPERIOR FOREHEAD

## 2019-07-25 ASSESSMENT — LOCATION ZONE DERM
LOCATION ZONE: TRUNK
LOCATION ZONE: SCALP
LOCATION ZONE: EYELID
LOCATION ZONE: FACE

## 2019-07-25 NOTE — PROCEDURE: ACNE SURGERY
Acne Type: Comedonal Lesions
Detail Level: Detailed
Render Number Of Lesions Treated: no
Prep Text (Optional): Prior to removal the treatment areas were prepped in the usual fashion.
Consent was obtained and risks were reviewed including but not limited to scarring, infection, bleeding, scabbing, incomplete removal, and allergy to anesthesia.
Post-Care Instructions: I reviewed with the patient in detail post-care instructions. Patient is to keep the treatment areaas dry overnight, and then apply bacitracin twice daily until healed. Patient may apply hydrogen peroxide soaks to remove any crusting.

## 2019-07-25 NOTE — PROCEDURE: INTRALESIONAL KENALOG
Total Volume Injected (Ccs- Only Use Numbers And Decimals): 1
Concentration Of Solution Injected (Mg/Ml): 2.5
X Size Of Lesion In Cm (Optional): 0
Medical Necessity Clause: This procedure was medically necessary because the lesions that were treated were:
Detail Level: Detailed
Kenalog Preparation: Kenalog
Include Z78.9 (Other Specified Conditions Influencing Health Status) As An Associated Diagnosis?: No
Total Volume Injected (Ccs- Only Use Numbers And Decimals): 2

## 2019-07-25 NOTE — PROCEDURE: CHEMICAL PEEL GLYCOLIC ACID
Post Peel Care: The peel was neutralized with sodium bicarbonate.  After the procedure, the treatment area was washed with soap and water, and a post-peel cream was applied. Sun protection and post-care instructions were reviewed with the patient.
Prep: The treated area was degreased with acetone and vaseline was applied for protection of mucous membranes.
Treatment Number: 0
Detail Level: Zone
Price (Use Numbers Only, No Special Characters Or $): 00
Time (Mins): 3
Glycolic Acid %: 30%

## 2019-08-29 ENCOUNTER — APPOINTMENT (OUTPATIENT)
Dept: URBAN - METROPOLITAN AREA CLINIC 292 | Age: 74
Setting detail: DERMATOLOGY
End: 2019-08-29

## 2019-08-29 DIAGNOSIS — L21.8 OTHER SEBORRHEIC DERMATITIS: ICD-10-CM

## 2019-08-29 DIAGNOSIS — L70.0 ACNE VULGARIS: ICD-10-CM

## 2019-08-29 DIAGNOSIS — L40.0 PSORIASIS VULGARIS: ICD-10-CM

## 2019-08-29 DIAGNOSIS — L66.8 OTHER CICATRICIAL ALOPECIA: ICD-10-CM

## 2019-08-29 PROCEDURE — OTHER ACNE SURGERY: OTHER

## 2019-08-29 PROCEDURE — OTHER TREATMENT REGIMEN: OTHER

## 2019-08-29 PROCEDURE — 10040 ACNE SURGERY: CPT

## 2019-08-29 PROCEDURE — OTHER CHEMICAL PEEL GLYCOLIC ACID: OTHER

## 2019-08-29 PROCEDURE — OTHER PRESCRIPTION: OTHER

## 2019-08-29 PROCEDURE — 11900 INJECT SKIN LESIONS </W 7: CPT

## 2019-08-29 PROCEDURE — 99213 OFFICE O/P EST LOW 20 MIN: CPT | Mod: 25

## 2019-08-29 PROCEDURE — OTHER COUNSELING: OTHER

## 2019-08-29 PROCEDURE — OTHER INTRALESIONAL KENALOG: OTHER

## 2019-08-29 RX ORDER — BETAMETHASONE DIPROPIONATE 0.5 MG/G
CREAM TOPICAL
Qty: 1 | Refills: 3 | Status: ERX | COMMUNITY
Start: 2019-08-29

## 2019-08-29 ASSESSMENT — LOCATION DETAILED DESCRIPTION DERM
LOCATION DETAILED: RIGHT SUPERIOR MEDIAL FOREHEAD
LOCATION DETAILED: LEFT CENTRAL FRONTAL SCALP
LOCATION DETAILED: LEFT INFERIOR CENTRAL MALAR CHEEK
LOCATION DETAILED: RIGHT MEDIAL SUPERIOR EYELID
LOCATION DETAILED: LEFT INFERIOR MEDIAL FOREHEAD
LOCATION DETAILED: LEFT CENTRAL PARIETAL SCALP
LOCATION DETAILED: RIGHT SUPERIOR FOREHEAD

## 2019-08-29 ASSESSMENT — LOCATION SIMPLE DESCRIPTION DERM
LOCATION SIMPLE: LEFT CHEEK
LOCATION SIMPLE: LEFT FOREHEAD
LOCATION SIMPLE: RIGHT SUPERIOR EYELID
LOCATION SIMPLE: LEFT SCALP
LOCATION SIMPLE: RIGHT FOREHEAD
LOCATION SIMPLE: SCALP

## 2019-08-29 ASSESSMENT — LOCATION ZONE DERM
LOCATION ZONE: SCALP
LOCATION ZONE: FACE
LOCATION ZONE: EYELID

## 2019-08-29 NOTE — PROCEDURE: ACNE SURGERY
Detail Level: Detailed
Acne Type: Comedonal Lesions
Post-Care Instructions: I reviewed with the patient in detail post-care instructions. Patient is to keep the treatment areaas dry overnight, and then apply bacitracin twice daily until healed. Patient may apply hydrogen peroxide soaks to remove any crusting.
Render Post-Care Instructions In Note?: no
Prep Text (Optional): Prior to removal the treatment areas were prepped in the usual fashion.
Consent was obtained and risks were reviewed including but not limited to scarring, infection, bleeding, scabbing, incomplete removal, and allergy to anesthesia.

## 2019-08-29 NOTE — PROCEDURE: INTRALESIONAL KENALOG
Size Of Lesion (Optional): 0
Medical Necessity Clause: This procedure was medically necessary because the lesions that were treated were:
Kenalog Preparation: Kenalog
Concentration Of Solution Injected (Mg/Ml): 2.5
Total Volume Injected (Ccs- Only Use Numbers And Decimals): 1
Detail Level: Detailed
Total Volume Injected (Ccs- Only Use Numbers And Decimals): 2
Include Z78.9 (Other Specified Conditions Influencing Health Status) As An Associated Diagnosis?: No

## 2019-08-29 NOTE — PROCEDURE: CHEMICAL PEEL GLYCOLIC ACID
Price (Use Numbers Only, No Special Characters Or $): 00
Prep: The treated area was degreased with acetone and vaseline was applied for protection of mucous membranes.
Post Peel Care: The peel was neutralized with sodium bicarbonate.  After the procedure, the treatment area was washed with soap and water, and a post-peel cream was applied. Sun protection and post-care instructions were reviewed with the patient.
Glycolic Acid %: 30%
Detail Level: Zone
Treatment Number: 0
Time (Mins): 3

## 2019-09-26 ENCOUNTER — APPOINTMENT (OUTPATIENT)
Dept: URBAN - METROPOLITAN AREA CLINIC 292 | Age: 74
Setting detail: DERMATOLOGY
End: 2019-09-26

## 2019-09-26 DIAGNOSIS — L66.8 OTHER CICATRICIAL ALOPECIA: ICD-10-CM

## 2019-09-26 DIAGNOSIS — L40.0 PSORIASIS VULGARIS: ICD-10-CM

## 2019-09-26 DIAGNOSIS — L70.0 ACNE VULGARIS: ICD-10-CM

## 2019-09-26 DIAGNOSIS — L21.8 OTHER SEBORRHEIC DERMATITIS: ICD-10-CM

## 2019-09-26 PROCEDURE — OTHER ACNE SURGERY: OTHER

## 2019-09-26 PROCEDURE — OTHER INTRALESIONAL KENALOG: OTHER

## 2019-09-26 PROCEDURE — 10040 ACNE SURGERY: CPT

## 2019-09-26 PROCEDURE — OTHER TREATMENT REGIMEN: OTHER

## 2019-09-26 PROCEDURE — OTHER CHEMICAL PEEL GLYCOLIC ACID: OTHER

## 2019-09-26 PROCEDURE — OTHER COUNSELING: OTHER

## 2019-09-26 PROCEDURE — 11900 INJECT SKIN LESIONS </W 7: CPT

## 2019-09-26 PROCEDURE — 99213 OFFICE O/P EST LOW 20 MIN: CPT | Mod: 25

## 2019-09-26 ASSESSMENT — LOCATION SIMPLE DESCRIPTION DERM
LOCATION SIMPLE: RIGHT SUPERIOR EYELID
LOCATION SIMPLE: RIGHT FOREHEAD
LOCATION SIMPLE: LEFT SCALP
LOCATION SIMPLE: SCALP

## 2019-09-26 ASSESSMENT — LOCATION DETAILED DESCRIPTION DERM
LOCATION DETAILED: RIGHT MEDIAL SUPERIOR EYELID
LOCATION DETAILED: RIGHT SUPERIOR MEDIAL FOREHEAD
LOCATION DETAILED: LEFT CENTRAL FRONTAL SCALP
LOCATION DETAILED: LEFT CENTRAL PARIETAL SCALP
LOCATION DETAILED: RIGHT SUPERIOR FOREHEAD

## 2019-09-26 ASSESSMENT — LOCATION ZONE DERM
LOCATION ZONE: FACE
LOCATION ZONE: SCALP
LOCATION ZONE: EYELID

## 2019-09-26 NOTE — PROCEDURE: INTRALESIONAL KENALOG
Concentration Of Solution Injected (Mg/Ml): 2.5
Size Of Lesion (Optional): 0
Detail Level: Detailed
Total Volume Injected (Ccs- Only Use Numbers And Decimals): 2
Kenalog Preparation: Kenalog

## 2019-09-26 NOTE — PROCEDURE: CHEMICAL PEEL GLYCOLIC ACID
Glycolic Acid %: 30%
Detail Level: Zone
Treatment Number: 0
Price (Use Numbers Only, No Special Characters Or $): 00
Post Peel Care: The peel was neutralized with sodium bicarbonate.  After the procedure, the treatment area was washed with soap and water, and a post-peel cream was applied. Sun protection and post-care instructions were reviewed with the patient.
Time (Mins): 3
Prep: The treated area was degreased with acetone and vaseline was applied for protection of mucous membranes.

## 2019-10-08 ENCOUNTER — APPOINTMENT (OUTPATIENT)
Dept: URBAN - METROPOLITAN AREA CLINIC 292 | Age: 74
Setting detail: DERMATOLOGY
End: 2019-10-08

## 2019-10-08 DIAGNOSIS — L40.0 PSORIASIS VULGARIS: ICD-10-CM

## 2019-10-08 PROCEDURE — OTHER ORDER TESTS: OTHER

## 2019-10-08 ASSESSMENT — LOCATION ZONE DERM: LOCATION ZONE: TRUNK

## 2019-10-08 ASSESSMENT — LOCATION SIMPLE DESCRIPTION DERM: LOCATION SIMPLE: CHEST

## 2019-10-08 ASSESSMENT — LOCATION DETAILED DESCRIPTION DERM: LOCATION DETAILED: LEFT LATERAL SUPERIOR CHEST

## 2019-10-22 ENCOUNTER — RX ONLY (RX ONLY)
Age: 74
End: 2019-10-22

## 2019-10-22 RX ORDER — INFLIXIMAB 100 MG/10ML
INJECTION, POWDER, LYOPHILIZED, FOR SOLUTION INTRAVENOUS
Qty: 1 | Refills: 5

## 2019-11-18 ENCOUNTER — APPOINTMENT (OUTPATIENT)
Dept: URBAN - METROPOLITAN AREA CLINIC 292 | Age: 74
Setting detail: DERMATOLOGY
End: 2019-11-18

## 2019-11-18 DIAGNOSIS — L66.8 OTHER CICATRICIAL ALOPECIA: ICD-10-CM

## 2019-11-18 DIAGNOSIS — L21.8 OTHER SEBORRHEIC DERMATITIS: ICD-10-CM

## 2019-11-18 DIAGNOSIS — L70.0 ACNE VULGARIS: ICD-10-CM

## 2019-11-18 DIAGNOSIS — L40.0 PSORIASIS VULGARIS: ICD-10-CM

## 2019-11-18 PROCEDURE — OTHER CHEMICAL PEEL GLYCOLIC ACID: OTHER

## 2019-11-18 PROCEDURE — 10040 ACNE SURGERY: CPT

## 2019-11-18 PROCEDURE — 11900 INJECT SKIN LESIONS </W 7: CPT

## 2019-11-18 PROCEDURE — OTHER INTRALESIONAL KENALOG: OTHER

## 2019-11-18 PROCEDURE — OTHER ACNE SURGERY: OTHER

## 2019-11-18 PROCEDURE — OTHER TREATMENT REGIMEN: OTHER

## 2019-11-18 PROCEDURE — OTHER COUNSELING: OTHER

## 2019-11-18 PROCEDURE — 99213 OFFICE O/P EST LOW 20 MIN: CPT | Mod: 25

## 2019-11-18 ASSESSMENT — LOCATION DETAILED DESCRIPTION DERM
LOCATION DETAILED: LEFT CENTRAL FRONTAL SCALP
LOCATION DETAILED: RIGHT SUPERIOR FOREHEAD
LOCATION DETAILED: LEFT CENTRAL PARIETAL SCALP
LOCATION DETAILED: RIGHT MEDIAL SUPERIOR EYELID
LOCATION DETAILED: RIGHT SUPERIOR MEDIAL FOREHEAD

## 2019-11-18 ASSESSMENT — LOCATION SIMPLE DESCRIPTION DERM
LOCATION SIMPLE: RIGHT FOREHEAD
LOCATION SIMPLE: SCALP
LOCATION SIMPLE: LEFT SCALP
LOCATION SIMPLE: RIGHT SUPERIOR EYELID

## 2019-11-18 ASSESSMENT — LOCATION ZONE DERM
LOCATION ZONE: EYELID
LOCATION ZONE: SCALP
LOCATION ZONE: FACE

## 2019-11-18 NOTE — PROCEDURE: CHEMICAL PEEL GLYCOLIC ACID
Prep: The treated area was degreased with acetone and vaseline was applied for protection of mucous membranes.
Time (Mins): 3
Price (Use Numbers Only, No Special Characters Or $): 00
Post Peel Care: The peel was neutralized with sodium bicarbonate.  After the procedure, the treatment area was washed with soap and water, and a post-peel cream was applied. Sun protection and post-care instructions were reviewed with the patient.
Detail Level: Zone
Glycolic Acid %: 30%
Treatment Number: 0

## 2019-11-18 NOTE — PROCEDURE: INTRALESIONAL KENALOG
Total Volume Injected (Ccs- Only Use Numbers And Decimals): 2
X Size Of Lesion In Cm (Optional): 0
Concentration Of Solution Injected (Mg/Ml): 2.5
Kenalog Preparation: Kenalog
Detail Level: Detailed

## 2019-11-18 NOTE — PROCEDURE: ACNE SURGERY
Detail Level: Detailed
Render Number Of Lesions Treated: no
Acne Type: Comedonal Lesions
Consent was obtained and risks were reviewed including but not limited to scarring, infection, bleeding, scabbing, incomplete removal, and allergy to anesthesia.
Post-Care Instructions: I reviewed with the patient in detail post-care instructions. Patient is to keep the treatment areaas dry overnight, and then apply bacitracin twice daily until healed. Patient may apply hydrogen peroxide soaks to remove any crusting.
Prep Text (Optional): Prior to removal the treatment areas were prepped in the usual fashion.

## 2020-01-01 ENCOUNTER — APPOINTMENT (OUTPATIENT)
Dept: URBAN - METROPOLITAN AREA CLINIC 292 | Age: 75
Setting detail: DERMATOLOGY
End: 2020-01-01

## 2020-01-01 DIAGNOSIS — L40.0 PSORIASIS VULGARIS: ICD-10-CM

## 2020-01-01 DIAGNOSIS — L66.8 OTHER CICATRICIAL ALOPECIA: ICD-10-CM

## 2020-01-01 DIAGNOSIS — L70.0 ACNE VULGARIS: ICD-10-CM

## 2020-01-01 DIAGNOSIS — L21.8 OTHER SEBORRHEIC DERMATITIS: ICD-10-CM

## 2020-01-01 PROCEDURE — OTHER TREATMENT REGIMEN: OTHER

## 2020-01-01 PROCEDURE — 99213 OFFICE O/P EST LOW 20 MIN: CPT | Mod: 25

## 2020-01-01 PROCEDURE — 11901 INJECT SKIN LESIONS >7: CPT

## 2020-01-01 PROCEDURE — OTHER COUNSELING: OTHER

## 2020-01-01 PROCEDURE — 10040 ACNE SURGERY: CPT

## 2020-01-01 PROCEDURE — OTHER INTRALESIONAL KENALOG: OTHER

## 2020-01-01 PROCEDURE — OTHER CHEMICAL PEEL GLYCOLIC ACID: OTHER

## 2020-01-01 PROCEDURE — OTHER PPD RESULT: OTHER

## 2020-01-01 PROCEDURE — OTHER PPD: OTHER

## 2020-01-01 PROCEDURE — 86580 TB INTRADERMAL TEST: CPT

## 2020-01-01 PROCEDURE — OTHER ACNE SURGERY: OTHER

## 2020-01-01 ASSESSMENT — LOCATION ZONE DERM
LOCATION ZONE: ARM
LOCATION ZONE: SCALP
LOCATION ZONE: EYELID
LOCATION ZONE: ARM
LOCATION ZONE: LEG
LOCATION ZONE: ARM
LOCATION ZONE: FACE

## 2020-01-01 ASSESSMENT — LOCATION DETAILED DESCRIPTION DERM
LOCATION DETAILED: LEFT PROXIMAL DORSAL FOREARM
LOCATION DETAILED: LEFT VENTRAL PROXIMAL FOREARM
LOCATION DETAILED: RIGHT PROXIMAL DORSAL FOREARM
LOCATION DETAILED: LEFT CENTRAL PARIETAL SCALP
LOCATION DETAILED: RIGHT SUPERIOR FOREHEAD
LOCATION DETAILED: LEFT CENTRAL FRONTAL SCALP
LOCATION DETAILED: RIGHT PROXIMAL PRETIBIAL REGION
LOCATION DETAILED: LEFT PROXIMAL PRETIBIAL REGION
LOCATION DETAILED: LEFT VENTRAL PROXIMAL FOREARM
LOCATION DETAILED: RIGHT MEDIAL SUPERIOR EYELID
LOCATION DETAILED: RIGHT SUPERIOR MEDIAL FOREHEAD
LOCATION DETAILED: RIGHT DISTAL DORSAL FOREARM
LOCATION DETAILED: RIGHT DISTAL PRETIBIAL REGION
LOCATION DETAILED: LEFT DISTAL PRETIBIAL REGION

## 2020-01-01 ASSESSMENT — LOCATION SIMPLE DESCRIPTION DERM
LOCATION SIMPLE: RIGHT PRETIBIAL REGION
LOCATION SIMPLE: LEFT FOREARM
LOCATION SIMPLE: RIGHT FOREARM
LOCATION SIMPLE: RIGHT FOREHEAD
LOCATION SIMPLE: SCALP
LOCATION SIMPLE: LEFT SCALP
LOCATION SIMPLE: LEFT FOREARM
LOCATION SIMPLE: LEFT FOREARM
LOCATION SIMPLE: RIGHT SUPERIOR EYELID
LOCATION SIMPLE: LEFT PRETIBIAL REGION

## 2020-01-08 ENCOUNTER — APPOINTMENT (OUTPATIENT)
Dept: URBAN - METROPOLITAN AREA CLINIC 292 | Age: 75
Setting detail: DERMATOLOGY
End: 2020-01-08

## 2020-01-08 DIAGNOSIS — L40.0 PSORIASIS VULGARIS: ICD-10-CM

## 2020-01-08 DIAGNOSIS — L70.0 ACNE VULGARIS: ICD-10-CM

## 2020-01-08 DIAGNOSIS — L66.8 OTHER CICATRICIAL ALOPECIA: ICD-10-CM

## 2020-01-08 DIAGNOSIS — L21.8 OTHER SEBORRHEIC DERMATITIS: ICD-10-CM

## 2020-01-08 PROCEDURE — OTHER CHEMICAL PEEL GLYCOLIC ACID: OTHER

## 2020-01-08 PROCEDURE — 99213 OFFICE O/P EST LOW 20 MIN: CPT | Mod: 25

## 2020-01-08 PROCEDURE — 10040 ACNE SURGERY: CPT

## 2020-01-08 PROCEDURE — 11901 INJECT SKIN LESIONS >7: CPT

## 2020-01-08 PROCEDURE — OTHER INTRALESIONAL KENALOG: OTHER

## 2020-01-08 PROCEDURE — OTHER COUNSELING: OTHER

## 2020-01-08 PROCEDURE — OTHER ACNE SURGERY: OTHER

## 2020-01-08 PROCEDURE — OTHER TREATMENT REGIMEN: OTHER

## 2020-01-08 ASSESSMENT — LOCATION DETAILED DESCRIPTION DERM
LOCATION DETAILED: RIGHT PROXIMAL PRETIBIAL REGION
LOCATION DETAILED: LEFT CENTRAL FRONTAL SCALP
LOCATION DETAILED: LEFT CENTRAL PARIETAL SCALP
LOCATION DETAILED: RIGHT SUPERIOR MEDIAL FOREHEAD
LOCATION DETAILED: RIGHT SUPERIOR FOREHEAD
LOCATION DETAILED: RIGHT DISTAL PRETIBIAL REGION
LOCATION DETAILED: RIGHT MEDIAL SUPERIOR EYELID
LOCATION DETAILED: LEFT PROXIMAL DORSAL FOREARM
LOCATION DETAILED: RIGHT PROXIMAL DORSAL FOREARM
LOCATION DETAILED: LEFT PROXIMAL PRETIBIAL REGION
LOCATION DETAILED: RIGHT DISTAL DORSAL FOREARM
LOCATION DETAILED: LEFT DISTAL PRETIBIAL REGION

## 2020-01-08 ASSESSMENT — LOCATION SIMPLE DESCRIPTION DERM
LOCATION SIMPLE: RIGHT SUPERIOR EYELID
LOCATION SIMPLE: RIGHT FOREARM
LOCATION SIMPLE: SCALP
LOCATION SIMPLE: LEFT FOREARM
LOCATION SIMPLE: RIGHT PRETIBIAL REGION
LOCATION SIMPLE: RIGHT FOREHEAD
LOCATION SIMPLE: LEFT SCALP
LOCATION SIMPLE: LEFT PRETIBIAL REGION

## 2020-01-08 ASSESSMENT — LOCATION ZONE DERM
LOCATION ZONE: LEG
LOCATION ZONE: EYELID
LOCATION ZONE: SCALP
LOCATION ZONE: FACE
LOCATION ZONE: ARM

## 2020-01-08 NOTE — PROCEDURE: ACNE SURGERY
Detail Level: Detailed
Acne Type: Comedonal Lesions
Post-Care Instructions: I reviewed with the patient in detail post-care instructions. Patient is to keep the treatment areaas dry overnight, and then apply bacitracin twice daily until healed. Patient may apply hydrogen peroxide soaks to remove any crusting.
Render Post-Care Instructions In Note?: no
Consent was obtained and risks were reviewed including but not limited to scarring, infection, bleeding, scabbing, incomplete removal, and allergy to anesthesia.
Prep Text (Optional): Prior to removal the treatment areas were prepped in the usual fashion.

## 2020-01-08 NOTE — PROCEDURE: INTRALESIONAL KENALOG
Include Z78.9 (Other Specified Conditions Influencing Health Status) As An Associated Diagnosis?: No
Kenalog Preparation: Kenalog
Total Volume Injected (Ccs- Only Use Numbers And Decimals): 1
X Size Of Lesion In Cm (Optional): 0
Total Volume Injected (Ccs- Only Use Numbers And Decimals): 2
Medical Necessity Clause: This procedure was medically necessary because the lesions that were treated were:
Detail Level: Detailed
Concentration Of Solution Injected (Mg/Ml): 2.5
Concentration Of Solution Injected (Mg/Ml): 10.0

## 2020-01-08 NOTE — PROCEDURE: CHEMICAL PEEL GLYCOLIC ACID
Prep: The treated area was degreased with acetone and vaseline was applied for protection of mucous membranes.
Time (Mins): 3
Treatment Number: 0
Glycolic Acid %: 30%
Post Peel Care: The peel was neutralized with sodium bicarbonate.  After the procedure, the treatment area was washed with soap and water, and a post-peel cream was applied. Sun protection and post-care instructions were reviewed with the patient.
Detail Level: Zone
Price (Use Numbers Only, No Special Characters Or $): 00

## 2020-03-03 PROBLEM — L40.0 PSORIASIS VULGARIS: Status: ACTIVE | Noted: 2020-01-01

## 2020-03-05 PROBLEM — L70.0 ACNE VULGARIS: Status: ACTIVE | Noted: 2020-01-01

## 2020-03-05 PROBLEM — L66.8 OTHER CICATRICIAL ALOPECIA: Status: ACTIVE | Noted: 2020-01-01

## 2020-03-05 PROBLEM — L40.0 PSORIASIS VULGARIS: Status: ACTIVE | Noted: 2020-01-01

## 2020-03-05 PROBLEM — L21.8 OTHER SEBORRHEIC DERMATITIS: Status: ACTIVE | Noted: 2020-01-01

## 2020-03-05 NOTE — PROCEDURE: CHEMICAL PEEL GLYCOLIC ACID
Glycolic Acid %: 30%
Post Peel Care: The peel was neutralized with sodium bicarbonate.  After the procedure, the treatment area was washed with soap and water, and a post-peel cream was applied. Sun protection and post-care instructions were reviewed with the patient.
Prep: The treated area was degreased with acetone and vaseline was applied for protection of mucous membranes.
Detail Level: Zone
Treatment Number: 0
Price (Use Numbers Only, No Special Characters Or $): 00
Time (Mins): 3

## 2020-03-05 NOTE — PROCEDURE: INTRALESIONAL KENALOG
X Size Of Lesion In Cm (Optional): 0
Detail Level: Detailed
Total Volume Injected (Ccs- Only Use Numbers And Decimals): 1
Kenalog Preparation: Kenalog
Total Volume Injected (Ccs- Only Use Numbers And Decimals): 2
Medical Necessity Clause: This procedure was medically necessary because the lesions that were treated were:
Concentration Of Solution Injected (Mg/Ml): 10.0
Concentration Of Solution Injected (Mg/Ml): 2.5
Include Z78.9 (Other Specified Conditions Influencing Health Status) As An Associated Diagnosis?: No

## 2020-03-05 NOTE — PROCEDURE: ACNE SURGERY
Acne Type: Comedonal Lesions
Consent was obtained and risks were reviewed including but not limited to scarring, infection, bleeding, scabbing, incomplete removal, and allergy to anesthesia.
Prep Text (Optional): Prior to removal the treatment areas were prepped in the usual fashion.
Render Post-Care Instructions In Note?: no
Post-Care Instructions: I reviewed with the patient in detail post-care instructions. Patient is to keep the treatment areaas dry overnight, and then apply bacitracin twice daily until healed. Patient may apply hydrogen peroxide soaks to remove any crusting.
Detail Level: Detailed

## 2022-10-08 NOTE — PROCEDURE: ACNE SURGERY
Render Post-Care Instructions In Note?: no
Detail Level: Detailed
Post-Care Instructions: I reviewed with the patient in detail post-care instructions. Patient is to keep the treatment areaas dry overnight, and then apply bacitracin twice daily until healed. Patient may apply hydrogen peroxide soaks to remove any crusting.
Prep Text (Optional): Prior to removal the treatment areas were prepped in the usual fashion.
Acne Type: Comedonal Lesions
Consent was obtained and risks were reviewed including but not limited to scarring, infection, bleeding, scabbing, incomplete removal, and allergy to anesthesia.
None